# Patient Record
Sex: MALE | Race: WHITE
[De-identification: names, ages, dates, MRNs, and addresses within clinical notes are randomized per-mention and may not be internally consistent; named-entity substitution may affect disease eponyms.]

---

## 2019-10-09 ENCOUNTER — HOSPITAL ENCOUNTER (EMERGENCY)
Dept: HOSPITAL 88 - FSED | Age: 79
Discharge: HOME | End: 2019-10-09
Payer: MEDICARE

## 2019-10-09 VITALS — HEIGHT: 76 IN | WEIGHT: 250 LBS | BODY MASS INDEX: 30.44 KG/M2

## 2019-10-09 DIAGNOSIS — S60.222A: Primary | ICD-10-CM

## 2019-10-09 DIAGNOSIS — E03.9: ICD-10-CM
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
## 2019-10-09 DIAGNOSIS — E78.5: ICD-10-CM
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
## 2019-10-09 DIAGNOSIS — Z95.810: ICD-10-CM

## 2019-10-09 DIAGNOSIS — I10: ICD-10-CM

## 2019-10-09 PROCEDURE — 99283 EMERGENCY DEPT VISIT LOW MDM: CPT

## 2019-10-09 NOTE — XMS REPORT
Summary of Care

                             Created on: 2019



Kyler Gordillo

External Reference #: [id number]

: 1940

Sex: Male



Demographics







                          Address                   [address]  [address]

 

                          Home Phone                [phone]

 

                          Preferred Language        English

 

                          Marital Status            

 

                          Restoration Affiliation     [id number]

 

                          Race                      White

 

                          Ethnic Group              Non-





Author







                          Author                    [facility]

 

                          Organization              [facility]

 

                          Address                   Unknown

 

                          Phone                     Unavailable







Support







                Name            Relationship    Address         Phone

 

                    Cathryn Gordillo    ECON                [address] [place]  [address]                [phone]

 

                Bhanu Gordillo    ECON            Unknown         Unavailable







Care Team Providers







                    Care Team Member Name    Role                Phone

 

                    Gail Gallegos MD    PCP                 [phone]







Reason for Visit

* 





 



                           Reason                    Comments

 

 



                           Cardiology Follow-up      Benign essential HTN









Encounter Details







                          Care Team                 Description



                     Date                Type                Department  

 

                                        



Rico Sabillon MD



[address]



[place] [address] [phone] (Fax)                      Cardiology Follow-up (Benign essential HTN)



                     2019          Office Visit        [facility]  



                                         [facility]  



                                         [address]  



                                         [place] [address]  



                                         [phone]  







Allergies







                                        Comments



                 Active Allergy     Reactions       Severity        Noted Date 

 

                                         



                           Codeine Phosphate         05/10/2010 

 

                                         



                           Motrin Ib                 05/10/2010 

 

                                         



                           Penicillins               05/10/2010 



documented as of this encounter (statuses as of 2019)



Medications







                          End Date                  Status



              Medication     Sig          Dispensed     Refills      Start  



                                         Date  

 

                                                    Active



                     famotidine (PEPCID) 40 MG     Take 1 Tab by       0   



                           tablet                    mouth two     



                                         times daily.     

 

                                                    Active



                     Multiple            Take 1 Tab by       0   



                           Vitamins-Minerals         mouth daily.     



                                         (CENTRUM SILVER OR)      

 

                                                    Active



                     mometasone (NASONEX) 50     daily.              0   



                                         MCG/ACT nasal spray      

 

                                                    Active



                     Tamsulosin HCl 0.4 MG     Take  by            0   



                           CAPS                      mouth daily.     

 

                                                    Active



                     Levothyroxine Sodium 25     Take  by            0   



                           MCG CAPS                  mouth daily.     

 

                                                    Active



                     Coenzyme Q10 (COQ10 OR)     Take  by            0   



                                         mouth daily.     

 

                                                    Active



                     Cetirizine HCl (ZYRTEC     Take  by            0   



                           OR)                       mouth daily.     

 

                                                    Active



              warfarin (COUMADIN) 2.5     TAKE 1 TABLET     90 Tab       1            /201  



                     MG tablet           BY MOUTH            9  



                                         DAILY AS     



                                         DIRECTED BY     



                                         INR     

 

                                                    Active



              atenolol (TENORMIN) 25 MG     TAKE 1 TABLET     180 Tab      0              



                     tablet              BY MOUTH TWO        9  



                                         TIMES DAILY     

 

                                                    Active



              pravastatin (PRAVACHOL)     TAKE 1 TABLET     90 Tab       0              



                     20 MG tablet        BY MOUTH            9  



                                         DAILY     



documented as of this encounter (statuses as of 2019)



Active Problems







 



                           Problem                   Noted Date

 

 



                           Leg edema, right          2019

 

 



                           S/P TAVR (transcatheter aortic valve replacement)     2018

 

 



                           Dementia                  2018

 

 



                           History of pacemaker      2017

 

 



                           Severe aortic stenosis     2017

 

 



                           Pre-syncope               2016

 

 



                           [facility]     2016

 

 



                           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

 

 



                           H/O amaurosis fugax       2016

 

 



                           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

 

 



                           Benign essential HTN      2016

 

 



                           Mixed hyperlipidemia      2016

 

 



                           Carotid artery disease ([facility])     2016

 

 



                           DVT (deep venous thrombosis) ([facility])     2016

 

 



                           Pulmonary embolism ([facility])     2016

 

 



                           Colonic polyp             2016

 

 



                           Lumbosacral disc disease     2016

 

 



                           Bronchiolitis             2016

 

 



                           History of tobacco abuse     2016

 

 



                           Chronic renal insufficiency     2016

 

 



                           Sleep apnea               2016

 

 



                           Diverticulosis of colon     2016

 

 



                           Peptic ulcer disease      2016

 

 



                           Migraine headache         2016

 

 



                           GERD (gastroesophageal reflux disease)     2016

 

 



                           Renal cyst                2016

 

 



                           Seborrheic keratosis      2014

 

 



                           History of nonmelanoma skin cancer     2014

 

 



                           Rosacea                   05/10/2010

 

 



                           BCC (basal cell carcinoma), eyelid     05/10/2010



documented as of this encounter (statuses as of 2019)



Social History







                                        Date



                 Tobacco Use     Types           Packs/Day       Years Used 

 

                                         



                                         Former Smoker    

 

    



                                         Smokeless Tobacco: Never   



                                         Used   









                    Drinks/Week         oz/Week             Comments



                                         Alcohol Use   

 

                                                             



                                         No   









 



                           Sex Assigned at Birth     Date Recorded

 

 



                                         Not on file 









                                        Industry



                           Job Start Date            Occupation 

 

                                        Not on file



                           Not on file               Not on file 









                                        Travel End



                           Travel History            Travel Start 

 





                                         No recent travel history available.



documented as of this encounter



Last Filed Vital Signs







                    Reading             Time Taken          Comments



                                         Vital Sign   

 

                    130/90              2019  1:33 PM CDT     



                                         Blood Pressure   

 

                    69                  2019  1:23 PM CDT     



                                         Pulse   

 

                    -                   -                    



                                         Temperature   

 

                    -                   -                    



                                         Respiratory Rate   

 

                    -                   -                    



                                         Oxygen Saturation   

 

                    -                   -                    



                                         Inhaled Oxygen   



                                         Concentration   

 

                    111.6 kg (246 lb)    2019  1:23 PM CDT     



                                         Weight   

 

                    193 cm (6' 4")      2019  1:23 PM CDT     



                                         Height   

 

                    29.94               2019  1:23 PM CDT     



                                         Body Mass Index   



documented in this encounter



Progress Notes

* Rico Sabillon MD - 2019  2:00 PM CDT



Formatting of this note might be different from the original.

Rico Sabillon MD Office HP/Followup

 Today's Date: 2019 2:15 PM



Kyler Gordillo is a 75 y.o. male patient.

 YOB: 1940 



 Referring MD: 

Gail Gallegos MD (PCP)

[address] [place] [address] , [address] [place] [address]

Phone: [phone]



Octavio Torres MD (N)

Address: [address] [place], [place] [address] [address]

Phone: [phone]



[facility]-Medical Record # [id number].

[facility] Medical Record # [id number]

Estimated body mass index is 29.94 kg/m as calculated from the following:

  Height as of this encounter: 6' 4" (1.93 m).

  Weight as of this encounter: 246 lb (111.6 kg).

Body surface area is 2.45 meters squared.



Present HPI  19 

SInce Last Visit the following is noted:Underwent implant of Annia S3#26 on 5/2
3/18, Afib on Cd

Standard mild FERNANDEZ and leg swelling

Not on Lasix

Broke left femur (pinned) 2019



OPV 1/3/19

SInce Last Visit the following is noted:

Wife complains that he has swelling of both extremities x 2 weeks but R>L

Some redness

Received Lasix from [facility]

Still some SOB with exertion

As usual all the history is from her

INR=3.5



OPV 18

SInce Last Visit the following is noted:

Underwent implant of Annia S3#26 on 18

Much improved

Still some SOB but less fatigue



OPV 3/15/18

Went to [facility] 3/13/18

Low BP; BUN-22, Cr=1.46; H/H=

YVK=907; CXR-OK

Mainly exeertional dyspnea

Recent upper RSCP

Neuro started Zoloft for dementia but she thinks it made him with worsening SOB,
thus it was stopped



OPV 17

VVI implant 17 for symptomatic bradycardia in chronic Atrial fibrillation

He is really not any better

Still fatigued and SOB

No angina

INR=3.4

Took steroids for bad left knee



OPV 17

SInce Last Visit the following is noted:

 he has had a URI

Symptoms include lethargy and exertional dyspnea

Also he has had progressive deterioration in his cognitive function. Seen by loc
al neurologist

No angina

Last KRISTA~ 1.2 with P/M=50/30



OPV 3/13/16

BOOP

Progressive AS

atrial fibrillation

RICAS - 5/25/10

Many co-morbidities

Having pre syncopal episodes which are increasing in frquency.

Two this month

All exertional

Denies chest pain

Knownincreasing AS

Usually sees Dr Hammond (pulmonary) but saw Dr Lang in his place- CT and other te
sts ordered. 

Though he has NO claudication, pt was prescribed Trental??



Angiography 

5/15/18



 TTE-implant of Annia S3#26 on 18; Mitral annular calcium

TTE-DATE 5/5/14

  7/16/15

 3/31/16 9/29/16 4/7/17 2/28/18 5/24/18 6/21/18 1/3/19 9/30/19  

LVEF

 60 60 60 60 55 55 60 60 60 60  

DI   

   0.30 0.27 0.61 0.51 0.44 0.45  

 P/M

 33/18 44/24 43/23 49/22 56/35 60/32 22/10 16  

 Max Adalberto

 2.88  3.31

 2.92 3.14 3.75 3.87 2.36 2.04 2.40 2.4  

 KRISTA

 1.7  1.5

 1.2 1.1 1.0 1.0      

pPA       45  45 40-45  

TR         2+ 2+  



CX-RAYS-

DATE: 16

Large heart, clear lungs



 CT- Chest 

DATE:3/14/16-outside

Main PA is 3.9cm

Mild PIF

emphysema



 YUSUF DOPPLER-

DATE: 12

Normal

Repeat outside 3/15/16-non specific



CAROTID DOPPLER-

DATE: 3/31/16

Right Carotid: patent stent (5/25/10)

All others OK



VENOUS DOPPLER-

DATE:11

Pro OK



HOLTERs-

DATES: 2016

Atrial fib with pauses> 4.0 sec 



 PREVIOUS PACER CHECKs-DATES:



PPM-DATE 17  

Company

 [employer]  

Mode VVIR VVIR  

DOI

 17  

Gen Change

    

EOL

 ~ ~  

A/V pace Xx % a pacing

yy % v pacing 52 % pacing  

other    



Allergies:  

Allergies 

Allergen Reactions 

 Codeine Phosphate  

 Motrin Ib  

 Pcn [Penicillins]  

 

Past Medical History

Diagnosis 

 H/O amaurosis fugax 

 Chronic inflammatory demyelinating polyneuritis 

 Aortic valve disease-progressive by TTE 

 Benign essential HTN 

 Mixed hyperlipidemia 

 Carotid artery disease- [facility] 5/29/10 

 DVT (deep venous thrombosis) 

 Pulmonary embolism 

 Colonic polyp 

 Lumbosacral disc disease 

 Bronchiolitis 

 History of tobacco abuse 

 Chronic renal insufficiency 

 Sleep apnea 

 Diverticulosis of colon 

 Peptic ulcer disease 

 Migraine headache 

 GERD (gastroesophageal reflux disease) 

 Renal cyst 

 Atrial fibrillation with slow VR 

 

Surgical History:

Past Surgical History: 

Procedure Laterality Date 

 HX BACK SURGERY   

 HX CHOLECYSTECTOMY, LAPAROSCOPIC   

 HX LUMBAR DISC SURGERY   

 HX POLYPECTOMY   

 HX SHOULDER SURGERY Right  

 HX SKIN CANCER EXCISION   

 BCC 



Social History:

Social History

  Socioeconomic History

    Marital status: 

    Spouse name: Not on file

    Number of children: Not on file

    Years of education: Not on file

    Highest education level: Not on file

  Occupational History

    Occupation: retired

  Social Needs

    Financial resource strain: Not on file

    Food insecurity:

      Worry: Not on file

      Inability: Not on file

    Transportation needs:

      Medical: Not on file

      Non-medical: Not on file

  Tobacco Use

    Smoking status: Former Smoker

    Smokeless tobacco: Never Used

  Substance and Sexual Activity

    Alcohol use: No

    Drug use: No

    Sexual activity: Not on file

  Lifestyle

    Physical activity:

      Days per week: Not on file

      Minutes per session: Not on file

    Stress: Not on file

  Relationships

    Social connections:

      Talks on phone: Not on file

      Gets together: Not on file

      Attends [employer] service: Not on file

      Active member of club or organization: Not on file

      Attends meetings of clubs or organizations: Not on file

      Relationship status: Not on file

    Intimate partner violence:

      Fear of current or ex partner: Not on file

      Emotionally abused: Not on file

      Physically abused: Not on file

      Forced sexual activity: Not on file

  Other Topics

    Concerns:

      Not on file

  Social History Narrative

    Not on file

  

Family History:  

Family History 

Problem Relation Name Age of Onset 

 Hypertension Father   

 CVA/Stroke Father   

 Heart Attack Father   

 CVA/Stroke Brother   

 Cancer Mother   

 Diabetes Neg Hx   

 

Cardiac Risk assessment:

Hypertension- yes

Hypercholesterol- yes

Obesity- yes

Postive Family History- yes

Diabetes Mellitus- no



Medications:

 atenolol (TENORMIN) 25 MG tablet TAKE 1 TABLET BY MOUTH TWO  TIMES DAILY 

 Cetirizine HCl (ZYRTEC OR) Take  by mouth daily. 

 Coenzyme Q10 (COQ10 OR) Take  by mouth daily. 

 famotidine (PEPCID) 40 MG tablet Take 1 Tab by mouth two times daily. 

 Levothyroxine Sodium 25 MCG CAPS Take  by mouth daily. 

 mometasone (NASONEX) 50 MCG/ACT nasal spray daily. 

 Multiple Vitamins-Minerals (CENTRUM SILVER OR) Take 1 Tab by mouth daily. 

 pravastatin (PRAVACHOL) 20 MG tablet TAKE 1 TABLET BY MOUTH  DAILY 

 Tamsulosin HCl 0.4 MG CAPS Take  by mouth daily. 

 warfarin (COUMADIN) 2.5 MG tablet TAKE 1 TABLET BY MOUTH  DAILY AS DIRECTED 
BY INR 



12 Point ROS: 

General: fatigue

ENT: nasal congestion, sinusitis

Cardiovascular: shortness of breath on exertion

GI: diverticulitis/osis

: nighttime urination (nocturia)

Musculoskeletal: back pain, arthritis

Endocrine: hypoglycemia

Allergic/Immunologic: persistent infections



Physical Exam

[facility] CARDI VITALS 2019 

Height 6' 4" - 

Weight 246 lb - 

/80 130/90 

BP Location right arm left arm 

Patient Position Sitting - 

Pulse 69 - 

Resp - - 

BSA (Calculated - sq m) - - 

BMI (Calculated) - - 

Some recent data might be hidden 



General Appearance:    Alert, cooperative, no distress, appears stated age 

Head:    Normocephalic, without obvious abnormality, atraumatic 

Eyes:    PERRL, conjunctiva/corneas clear, EOM's intact, fundi 

Ears:    Normal TM's and external ear canals, both ears 

Nose:   Nares normal, septum midline, mucosa normal, no drainage    or sinus ten
derness 

Throat:   Lips, mucosa, and tongue normal; teeth and gums normal 

Neck:   Supple, symmetrical, trachea midline, no adenopathy;    

  thyroid:  No enlargement/tenderness/nodules; no carotid

  bruit or JVD 

Back:     Symmetric, no curvature, ROM normal, no CVA tenderness 

Lungs:     Clear to auscultation bilaterally, respirations unlabored 

Chest wall:    No tenderness or deformity 

Heart:    irregular rate and rhythm,

  S1  S2 

  Murmur: none 

Abdomen:     Soft, non-tender, bowel sounds active all four quadrants, 

  no masses, no organomegaly 

Extremities:   Extremities normal, atraumatic, no cyanosis or edema 

  

Skin:   Skin color, texture, turgor normal, no rashes or lesions 

  

Neurologic:   CNII-XII intact. Normal strength, sensation and reflexes   

  throughout 



Vascular Physical  Examination-

Right lower extremity 2+ edema



EC/30/19         1/3/19



6/2/18           3/5/18



DECISION MAKING PLANS for PROBLEMS-



Patient Active Problem List 

Diagnosis 

 Rosacea 

 BCC (basal cell carcinoma), eyelid 

 Seborrheic keratosis 

 History of nonmelanoma skin cancer 

 H/O amaurosis fugax 

 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 

 Benign essential HTN 

 Mixed hyperlipidemia 

 Carotid artery disease ([facility]) 

 DVT (deep venous thrombosis) ([facility]) 

 Pulmonary embolism ([facility]) 

 Colonic polyp 

 Lumbosacral disc disease 

 Bronchiolitis 

 History of tobacco abuse 

 Chronic renal insufficiency 

 Sleep apnea 

 Diverticulosis of colon 

 Peptic ulcer disease 

 Migraine headache 

 GERD (gastroesophageal reflux disease) 

 Renal cyst 

 Pre-syncope 

 Atrial fibrillation ([facility]) 

 Tachycardia-bradycardia syndrome ([facility]) 

 History of pacemaker 

 Severe aortic stenosis 

 Dementia 

 S/P TAVR (transcatheter aortic valve replacement) 

 Leg edema, right 



TODAY'S PLAN-all old records reviewed today

1-Fax appropriate records to [facility]

2- No changes made today



 

Rico Sabillon MD FAC FACP [facility]

Rico Sabillon MD FAC FACP [facility]

[facility]

Clinical Professor [facility] 

 of PVD Services at [facility]/\[facility] [place] [facility]\""

Chief of Peripheral Vascular Medicine at [facility]

[facility]  

[id number]            

[place] [address]

[email or web address]

[email or web address] 

[email or web address]







Electronically signed by Rico Sabillon MD at 2019  5:25 PM CDT

documented in this encounter



Plan of Treatment







                          Care Team                 Description



                     Date                Type                Specialty  

 

                                                     



                     10/07/2019          Procedure           Cardiology  



                                         Visit-Tech   



                                         Performed   









   



                 Health Maintenance     Due Date        Last Done       Comments

 

   



                           MEDICARE AWV              1940  

 

   



                           TETANUS SHOT (ADULT)      10/09/1953  

 

   



                           BMI FOLLOW UP PLAN        10/09/1956  

 

   



                           FALL SCREEN               10/09/2003  

 

   



                           PREVNAR >=65 (PCV13)      10/09/2003  

 

   



                     FLU VACCINE > 6 MONTHS     2019 (Declined) 

 

   



                     PNEUMOVAX >=65 (PPSV23)     Completed           2009 



documented as of this encounter



Procedures







                                        Comments



                 Procedure Name     Priority        Date/Time       Associated Diagnosis 

 

                                        



Results for this procedure are in the results section.



                 ELECTROCARDIOGRAM     Routine         2019      Benign essential HTN 



                           COMPLETE                  2:37 PM CDT  



documented in this encounter



Results

* ELECTROCARDIOGRAM COMPLETE (2019  2:37 PM CDT)





 



                           Narrative                 Performed At

 

 



                                         This result has an attachment that is not available. 



                                         Result approved by Rico Sabillon MD on 19 





documented in this encounter



Visit Diagnoses











                                         Diagnosis

 





                                         S/P TAVR (transcatheter aortic valve replacement) - Primary

 





                                         Benign essential HTN



                                         Essential hypertension, benign



documented in this encounter



Insurance







                                        Type



            Payer      Benefit     Subscriber ID     Effective     Phone      Address 



                           Plan /                    Dates   



                                         Group     

 

                                        Medicare



              MEDICARE     MEDICARE     xxxxxxxxxxx     10/1/2003-      [address] 



                     [address]          Present             [id number] 



                           - MEDICARE                [place], [place] 



                                         [address] 

 

                                        Medicare



              [employer]     [employer]         xxxxxxxxxxx     Effective      [address] 



                     MEDICARE            for all             [id number] 



                     SUPPLEMENT          dates               [facility] [place], [place] 



                                         [address] 









     



            Guarantor Name     Account     Relation to     Date of     Phone      Billing Address



                     Type                Patient             Birth  

 

     



            Kyler Gordillon     Personal/F     Self       1940     [phone]     [address]               (Home)              [place] [address]



documented as of this encounter

## 2019-10-09 NOTE — DIAGNOSTIC IMAGING REPORT
EXAMINATION:  HAND 3 VIEW LT - HOPD    



INDICATION: Fall, trauma



COMPARISON: None

     

FINDINGS:



No acute fracture or dislocation. There are severe degenerative changes at the

first carpometacarpal joint with bone-on-bone contact, osteophyte formation,

and lateral subluxation of the base of the first metacarpal. The soft tissues

appear unremarkable.



IMPRESSION: 

No acute osseous injury.



Severe degenerative changes of the first carpometacarpal joint as above.



Signed by: Rolo Garcia MD on 10/9/2019 12:38 PM

## 2019-10-09 NOTE — XMS REPORT
Patient Summary Document

                             Created on: 10/09/2019



Kyler Gordillo

External Reference #: [id number]

: 1940

Sex: Male



Demographics







                          Address                   [address] [place] [address] [place]  [address]

 

                          Home Phone                [phone]

 

                          Preferred Language        Unknown

 

                          Marital Status            Unknown

 

                          Mosque Affiliation     Unknown

 

                          Race                      Unknown

 

                          Ethnic Group              Unknown





Author







                          Author                    [facility]

 

                          Address                   Unknown

 

                          Phone                     Unavailable







Care Team Providers







                    Care Team Member Name    Role                Phone

 

                    TERRI JOHNSTON    Unavailable         Unavailable







Problems

This patient has no known problems.



Allergies, Adverse Reactions, Alerts

This patient has no known allergies or adverse reactions.



Medications

This patient has no known medications.



Results







           Test Description    Test Time    Test Comments    Text Results    Atomic Results    Result

 Comments

 

                BASIC METABOLIC PANEL    2018 05:54:00                      

 

   

 

                SODIUM (BEAKER) (test code=381)    143 meq/L       136-145          

 

                POTASSIUM (BEAKER) (test code=379)    4.1 meq/L       3.5-5.1          

 

                CHLORIDE (BEAKER) (test code=382)    111 meq/L                  

 

                CO2 (BEAKER) (test code=355)    22 meq/L        22-29            

 

                BLOOD UREA NITROGEN (BEAKER) (test code=354)    16 mg/dL        7-21             

 

                CREATININE (BEAKER) (test code=358)    1.09 mg/dL      0.57-1.25        

 

                GLUCOSE RANDOM (BEAKER) (test code=652)    94 mg/dL                   

 

                CALCIUM (BEAKER) (test code=697)    8.9 mg/dL       8.4-10.2         

 

                EGFR (BEAKER) (test code=1092)    65 mL/min/1.73 sq m                    ESTIMATED GFR IS NOT AS 

ACCURATE AS CREATININE CLEARANCE IN PREDICTING GLOMERULAR FILTRATION RATE. 
ESTIMATED GFR IS NOT APPLICABLE FOR DIALYSIS PATIENTS.





CBC (HEMOGRAM ONLY)2018 05:35:00* 





                Test Item       Value           Reference Range    Comments

 

                WHITE BLOOD CELL COUNT (BEAKER) (test code=775)    7.3 K/ L        3.5-10.5         

 

                RED BLOOD CELL COUNT (BEAKER) (test code=761)    4.10 M/ L       4.63-6.08        

 

                HEMOGLOBIN (BEAKER) (test code=410)    11.2 GM/DL      13.7-17.5        

 

                HEMATOCRIT (BEAKER) (test code=411)    35.8 %          40.1-51.0        

 

                MEAN CORPUSCULAR VOLUME (BEAKER) (test code=753)    87.3 fL         79.0-92.2        

 

                MEAN CORPUSCULAR HEMOGLOBIN (BEAKER) (test code=751)    27.3 pg         25.7-32.2        

 

                    MEAN CORPUSCULAR HEMOGLOBIN CONC (BEAKER) (test code=752)    31.3 GM/DL          32.3-36.5

                                         

 

                RED CELL DISTRIBUTION WIDTH (BEAKER) (test code=412)    14.6 %          11.6-14.4        

 

                PLATELET COUNT (BEAKER) (test code=756)    166 K/CU MM     150-450          

 

                MEAN PLATELET VOLUME (BEAKER) (test code=754)    9.9 fL          9.4-12.4         

 

                NUCLEATED RED BLOOD CELLS (BEAKER) (test code=413)    0 /100 WBC      0-0              





[id number] 10:23:00* 





                Test Item       Value           Reference Range    Comments

 

                ACTIVATED CLOTTING TIME (BEAKER) (test code=441)    109 sec                         TESTED AT [address] [address]





[id number] 09:47:00* 





                Test Item       Value           Reference Range    Comments

 

                ACTIVATED CLOTTING TIME (BEAKER) (test code=441)    252 sec                         TESTED AT [address] [address]





[id number] 09:30:00* 





                Test Item       Value           Reference Range    Comments

 

                ACTIVATED CLOTTING TIME (BEAKER) (test code=441)    230 sec                         TESTED AT [address] [address]





PROTHROMBIN TIME/[id number] 06:58:00* 





                Test Item       Value           Reference Range    Comments

 

                PROTIME (BEAKER) (test code=759)    15.9 seconds    11.7-14.7        

 

                INR (BEAKER) (test code=370)    1.3             <=5.9            





RECOMMENDED COUMADIN/WARFARIN INR THERAPY RANGESSTANDARD DOSE: 2.0 - 3.0   Inclu
shona: PROPHYLAXIS for venous thrombosis, systemic embolization; TREATMENT for zackary
ous thrombosis and/or pulmonary embolus.HIGH RISK: Target INR is 2.5-3.5 for pat
ients with mechanical heart valves.Within 24 hours, if on CoumadinBUN AND 
CREATININE2018-05-15 18:57:00* 





                Test Item       Value           Reference Range    Comments

 

                BLOOD UREA NITROGEN (BEAKER) (test code=354)    17 mg/dL        7-21             

 

                CREATININE (BEAKER) (test code=358)    1.23 mg/dL      0.57-1.25        

 

                EGFR (BEAKER) (test code=1092)    57 mL/min/1.73 sq m                    ESTIMATED GFR IS NOT AS 

ACCURATE AS CREATININE CLEARANCE IN PREDICTING GLOMERULAR FILTRATION RATE. 
ESTIMATED GFR IS NOT APPLICABLE FOR DIALYSIS PATIENTS.





B-TYPE NATRIURETIC FACTOR (BNP)2018-05-15 11:33:00* 





                Test Item       Value           Reference Range    Comments

 

                B-TYPE NATRIURETIC PEPTIDE (BEAKER) (test code=700)    370 pg/mL       0-100            





ALBUMIN2018-05-15 11:19:00* 





                Test Item       Value           Reference Range    Comments

 

                ALBUMIN (BEAKER) (test code=1145)    3.7 g/dL        3.5-5.0          





PROTHROMBIN TIME/INR2018-05-15 10:33:00* 





                Test Item       Value           Reference Range    Comments

 

                PROTIME (BEMAMTA) (test code=759)    16.9 seconds    11.7-14.7        

 

                INR (BEAKER) (test code=370)    1.4             <=5.9            





RECOMMENDED COUMADIN/WARFARIN INR THERAPY RANGESSTANDARD DOSE: 2.0 - 3.0   Inclu
shona: PROPHYLAXIS for venous thrombosis, systemic embolization; TREATMENT for zackary
ous thrombosis and/or pulmonary embolus.HIGH RISK: Target INR is 2.5-3.5 for pat
ients with mechanical heart valves.[id number] 08:21:00* 





                Test Item       Value           Reference Range    Comments

 

                POC-CREATININE (BEMAMTA) (test code=1859)    1.3 mg/dL       0.6-1.3         TESTED AT [facility] [address]

 [facility] [address]

 

                POC-EGFR (BEMAMTA) (test code=1860)    53 mL/min/1.73M2                     





CT, CTA, [id number] 17:08:00Addendum BeginsREPORT STATUS:A PATIENT ID:   
[id number] ADDENDUM: I agree with the nonvascular findings as reported by Dr. Fang. A 7 mm nodule is seen in the right middle lobe, slightly increased from 
2013 when it measured 5 mm. Follow-up chest CT in 6-12 months is recommended. 
Signed: Bryan, Betito MDReport Verified Date/Time:  2018 17:08:19 Reading 
Location: [place] [address] Angio Body Reading RoomAddendum EndsFINAL REPORT PATIENT 
ID:   [id number] CT angiography of the thoracoabdominal aorta and pelvic arteries,
2nd May 2018 INDICATION: This is a 77 year old male with a diagnosis of aortic 
stenosis presents for preprocedure TAVR assessment.  This study is performed in 
an attempt to avoid an invasive procedure. TECHNIQUE: Spiral acquisition before 
and during intravenous contrast administration using a Elida multidetector CT 
scanner. Images were obtained before and during the dynamic passage of 
intravenous contrast material.  Multi-planar 3-D volume-rendering reconstruction
was performed using an independent workstation interactively by the interpreting
physician as well as the 3-D specialist for optimal visualization of the 
thoracoabdominal aorta, the pelvic arteries as well as its proximal branches. 
Please refer to the contrast sheet scanned in the EPIC system for the amount and
route of contrast given. This exam was performed according to our departmental 
dose-optimisation programme, which includes automated exposure control, 
adjustment of the mA and/or kV according to patient size and/or use of iterative
reconstruction technique. Dose modulation, iterative reconstruction, and/or 
weight based adjustment of the mA/kV was utilized to reduce the radiation dose 
to as low as reasonably achievable. FINDINGS:  VASCULAR: An electronic device is
identified in the left upper chest, with pacing lead identified in the right-
sided cardiac chambers. The central pulmonary artery is normal in calibre. The 
cardiac chamber demonstrate normal atrioventricular and ventriculoarterial 
concordance, systemic and pulmonary venous return. Coronary artery origins are 
normal and coronary artery calcifications identified in the LAD and LCx territor
y. Minimal mitral annular calcification is identified in the posterior mitral va
lve annulus. The left ventricle is normal in size. Left atrial enlargement is id
entified. Patient has a diagnosis of aortic stenosis. Aortic valve is tricuspid.
Agatston score is approximately 3085. The location of aortic valvular calcifica
tion can be seen in reformatted data set sent to PACS. Regarding the aorta, ther
e is only minimal calcification seen in the aortic root and thereafter remainder
of the ascending thoracic aorta is free of calcification. There is mild calcifi
cation seen in the transverse arch, descending thoracic aorta, and moderate calc
ification seen in the infrarenal abdominal aorta. No acute aortic pathology is i
dentified and no dissection or contained rupture is seen. However, there is athe
rosclerotic ulceration identified in the distal infrarenal abdominal aorta, imag
e 401 representing a spectrum of both calcified and noncalcified granuloma. Arch
vessel branching pattern is normal and the visualised arch vessel has scattered 
calcific atherosclerosis identified. The left subclavian artery, at image 23, m
easure approximately 6 to 7 mm in diameter. The right subclavian artery, at imag
e 17, measure 9 to 10 mm in diameter. The coeliac axis, SMA have no obstructive 
lesion identified, however, calcific atherosclerosis seen in the celiac axis and
calcific and noncalcific atherosclerosis that is nonobstructive is seen in the 
SMA. Replaced right hepatic artery is identified, a common variant. The GURINDER is p
atent. There are single left and right renal arteries with no obstructive lesion
identified. The common iliac, external iliac, common femoral, and the visualized
superficial femoral arteries have no obstructive lesion identified. There is a
lso atherosclerotic ulceration identified in the right common iliac arteries, fo
r example image 476. Refer to below regarding the minimum dimensions. Dimensions
that may be helpful TAVR as follows: Only minimal calcification is seen in the 
aortic root. The major and minor aortic annulus diameter measures 29.2 and 21.8 
mm, respectively. The aortic annulus perimeter measured 83 mm and the cross-sect
ional area measures 520 mm2. The aortic annulus diameter at the traditional LVOT
and coronal LVOT measures 22.0 and 27.8 mm, respectively. Measurement was made 
using 30% reconstruction, with the least motion artefact. For reference purpose,
per GOLDMAN S3 brochure, recommendation are as follows: CT area between 273 to 
345 mm2 (20 mm valve); 338 to 430 mm2 (23 mm valve); 430 to 546 mm2 (26 mm valve
); 540 to 683 mm2 (29 mm valve). For reference purpose, per CoreValve Evolut R b
rochure, recommendation are as follows: CT perimeter between 56.5-62.8 mm (23 mm
valve); 62.8-72.3 mm (26 mm valve); 72.3-81.7 mm (29 mm valve); and 81.7-94.2. 
mm (34 mm valve). Agatston Score is 3085.   The sinus of Valsalva height to the 
takeoff of the coronary artery ostium, RCC (diastole): 13.5 mmThe sinus of Valsa
lva height and the takeoff of the coronary artery ostium, LCC (diastole): 15.0 m
m The sinus of Valsalva diameter, RCC (diastole): 32.8 mmThe sinus of Valsalva d
iameter, LCC (diastole): 32.5 mmThe sinus of Valsalva diameter, NCC (diastole): 
36.5 mm The sinotubular junction measures approximately 30.7 x 32.5 mm. The aort
ic root angulation measures 49.6 degrees. The minimal and perpendicular abdomina
l aortic diameter measure 13.4 and 16.7 mm, respectively, at the level of the at
herosclerotic ulceration, at image 397.  There is no evidence of thoracoabdomina
l aortic aneurysm or stent placement present. The minimum and the perpendicular 
left common iliac artery measures 10.2 and 10.9 mm, respectively with no  tortuo
sity and mild  calcific atherosclerosis present. The minimum and the perpendicul
ar left external iliac artery measures 7.6 and 8.2 mm, respectively with mild to
moderate  tortuosity and no  calcific atherosclerosis present. The minimum and 
the perpendicular left femoral artery measures 7.4 and 9.0 mm, respectively with
minimal  tortuosity and mild  calcific atherosclerosis present. The minimum and 
the perpendicular right common iliac artery measures 8.5 and 9.0 mm, respective
ly with mild  tortuosity and mild  calcific atherosclerosis present, at image 48
1 with localized atherosclerotic ulceration present. The minimum and the perpend
icular right  external iliac artery measures 8.1 and 8.3 mm, respectively with m
ild-to-moderate  tortuosity and no  calcific atherosclerosis present. The minimu
m and the perpendicular right femoral artery measures 8.6 and 9.1 mm, respective
ly with mild tortuosity and mild calcific atherosclerosis present. NONVASCULAR: 
The visualised thyroid gland has focal calcification identified, in the right th
yroid lobe, at image 26, measure 5 to 6 mm in diameter. This can be further asse
ssed by dedicated thyroid ultrasound scan. The chest wall and mediastinum has no
acute abnormalities identified. No significant adenopathy is seen. However, the
re is some small lymph nodes identified, for example, in the left hilum, measure
less than 1 cm in size. In the lung windows, no obvious endobronchial lesion is 
seen, and no pleural effusions identified. Some dependent changes are seen in t
he lung bases. Some subsegmental atelectatic changes are noted. A nodule is iden
tified in the right middle lobe, at image 107, measures approximately 7 to 8 mm 
in diameter. In 2013, this structure measures approximately 5 to 6 mm in diamete
r, image using different spatial resolution. No other nodule is identified. In t
he abdomen, the liver and spleen appears unremarkable. The liver edge is smooth.
No abnormal enhancing structure is identified. Hypodensities are seen in the li
nigel, some are too small to characterize. The larger ones have no enhancement aft
er contrast administration indicating simple in nature. The largest one, at imag
e 282, measure 3.6 cm in diameter. The adrenal glands are not enlarged. The panc
reas appears unremarkable. The gallbladder is not well visualized. No acute ajit
l pathology is seen and no hydronephrosis or perirenal fluid collections identif
ied. Small renal stones are seen, that is nonobstructive, and at image 69 of the
left kidney, measure 8 mm in diameter. In addition, renal cysts identified in t
he lateral aspect of the right kidney, image 337, measure 3.1 cm in diameter wit
h no enhancement after contrast administration suggesting proteinaceous/hemorrha
gic in nature. Another cyst is also identified, again with no significant enhanc
ement seen. Bowel is not well assessed by CT angiography as enteric contrast is 
not given. No obvious bowel dilation is identified. Colonic diverticulum is seen
in the distal colon with no evidence of acute diverticulitis. Bilateral fat-con
taining inguinal hernia is seen left greater than the right. The prostate gland 
is mildly prominent. The bladder is unremarkable. No free air or free fluid seen
abdomen and pelvis. No significant retroperitoneal adenopathy is identified. In 
the bony windows, no acute bony pathology is seen. Some degenerative changes are
noted. CONCLUSIONS: 1.  Patient has a diagnosis of aortic stenosis. The aortic 
valve is tricuspid. Agatston score is over 3000. By planimetry, the aortic valve
area is approximately 93 sq mm. Only minimal mitral annular calcification is s
een. No obvious calcification is seen in the aortic root. No acute aortic pathol
ogy is identified.  Localized atherosclerotic ulceration identified in the dista
l infrarenal aorta, as well as in the right common iliac artery. Dimensions that
may be helpful for TAVR as described above. 2.  Coronary atherosclerosis seen in
the left coronary system. 3.  No acute pulmonary pathology is identified.  A 7 
to 8 mm nodule seen in the right middle lobe. This is already been present in 2
013, however, in 2013, it measures 5 to 6 mm in diameter suggesting minimal incr
eased in size. This could be followed in 6-12 months time. Nevertheless, an adde
ndum will be dictated thereafter, if needed. 4.  Other findings as described abo
ve. Calcification is identified in the right thyroid lobe that can be further as
sessed by dedicated ultrasound scan of the thyroid. 5.  An addendum will dictate
d by the Consultant Radiologist regarding the nonvascular findings.  Signed: Oziel Interiano MDReport Verified Date/Time:  2018 16:08:18 Reading Location
: [place] [address] Cardiology MRI    Electronically signed by: BETITO ADAMS MD
on 2018 05:08 PM CT, CTA [id number] 17:08:00Addendum BeginsREPORT 
STATUS:A PATIENT ID:   [id number] ADDENDUM: I agree with the nonvascular findings 
as reported by Dr. Fang. A 7 mm nodule is seen in the right middle lobe, 
slightly increased from 2013 when it measured 5 mm. Follow-up chest CT in 6-12 
months is recommended. Signed: Betito Adams Verified Date/Time:  
2018 17:08:19 Reading Location: [place] [id number] Angio Body Reading R
oomAddendum EndsFINAL REPORT PATIENT ID:   [id number] CT angiography of the thorac
oabdominal aorta and pelvic arteries, 2nd May 2018 INDICATION: This is a 77 year
old male with a diagnosis of aortic stenosis presents for preprocedure TAVR ass
essment.  This study is performed in an attempt to avoid an invasive procedure. 
TECHNIQUE: Spiral acquisition before and during intravenous contrast administrat
ion using a Elida multidetector CT scanner. Images were obtained before and du
ring the dynamic passage of intravenous contrast material.  Multi-planar 3-D vol
ume-rendering reconstruction was performed using an independent workstation inte
ractively by the interpreting physician as well as the 3-D specialist for optima
l visualization of the thoracoabdominal aorta, the pelvic arteries as well as it
s proximal branches. Please refer to the contrast sheet scanned in the EPIC syst
em for the amount and route of contrast given. This exam was performed according
to our departmental dose-optimisation programme, which includes automated expos
ure control, adjustment of the mA and/or kV according to patient size and/or use
of iterative reconstruction technique. Dose modulation, iterative reconstructio
n, and/or weight based adjustment of the mA/kV was utilized to reduce the radiat
ion dose to as low as reasonably achievable. FINDINGS:  VASCULAR: An electronic 
device is identified in the left upper chest, with pacing lead identified in the
right-sided cardiac chambers. The central pulmonary artery is normal in calibre.
The cardiac chamber demonstrate normal atrioventricular and ventriculoarterial 
concordance, systemic and pulmonary venous return. Coronary artery origins are 
normal and coronary artery calcifications identified in the LAD and LCx territor
y. Minimal mitral annular calcification is identified in the posterior mitral va
lve annulus. The left ventricle is normal in size. Left atrial enlargement is id
entified. Patient has a diagnosis of aortic stenosis. Aortic valve is tricuspid.
Agatston score is approximately 3085. The location of aortic valvular calcifica
tion can be seen in reformatted data set sent to PACS. Regarding the aorta, ther
e is only minimal calcification seen in the aortic root and thereafter remainder
of the ascending thoracic aorta is free of calcification. There is mild calcifi
cation seen in the transverse arch, descending thoracic aorta, and moderate calc
ification seen in the infrarenal abdominal aorta. No acute aortic pathology is i
dentified and no dissection or contained rupture is seen. However, there is athe
rosclerotic ulceration identified in the distal infrarenal abdominal aorta, imag
e 401 representing a spectrum of both calcified and noncalcified granuloma. Arch
vessel branching pattern is normal and the visualised arch vessel has scattered 
calcific atherosclerosis identified. The left subclavian artery, at image 23, m
easure approximately 6 to 7 mm in diameter. The right subclavian artery, at imag
e 17, measure 9 to 10 mm in diameter. The coeliac axis, SMA have no obstructive 
lesion identified, however, calcific atherosclerosis seen in the celiac axis and
calcific and noncalcific atherosclerosis that is nonobstructive is seen in the 
SMA. Replaced right hepatic artery is identified, a common variant. The GURINDER is p
atent. There are single left and right renal arteries with no obstructive lesion
identified. The common iliac, external iliac, common femoral, and the visualized
superficial femoral arteries have no obstructive lesion identified. There is a
lso atherosclerotic ulceration identified in the right common iliac arteries, fo
r example image 476. Refer to below regarding the minimum dimensions. Dimensions
that may be helpful TAVR as follows: Only minimal calcification is seen in the 
aortic root. The major and minor aortic annulus diameter measures 29.2 and 21.8 
mm, respectively. The aortic annulus perimeter measured 83 mm and the cross-sect
ional area measures 520 mm2. The aortic annulus diameter at the traditional LVOT
and coronal LVOT measures 22.0 and 27.8 mm, respectively. Measurement was made 
using 30% reconstruction, with the least motion artefact. For reference purpose,
per [employer] S3 brochure, recommendation are as follows: CT area between 273 to 
345 mm2 (20 mm valve); 338 to 430 mm2 (23 mm valve); 430 to 546 mm2 (26 mm valve
); 540 to 683 mm2 (29 mm valve). For reference purpose, per CoreValve Evolut R b
rochure, recommendation are as follows: CT perimeter between 56.5-62.8 mm (23 mm
valve); 62.8-72.3 mm (26 mm valve); 72.3-81.7 mm (29 mm valve); and 81.7-94.2. 
mm (34 mm valve). Agatston Score is 3085.   The sinus of Valsalva height to the 
takeoff of the coronary artery ostium, RCC (diastole): 13.5 mmThe sinus of Valsa
lva height and the takeoff of the coronary artery ostium, LCC (diastole): 15.0 m
m The sinus of Valsalva diameter, RCC (diastole): 32.8 mmThe sinus of Valsalva d
iameter, LCC (diastole): 32.5 mmThe sinus of Valsalva diameter, NCC (diastole): 
36.5 mm The sinotubular junction measures approximately 30.7 x 32.5 mm. The aort
ic root angulation measures 49.6 degrees. The minimal and perpendicular abdomina
l aortic diameter measure 13.4 and 16.7 mm, respectively, at the level of the at
herosclerotic ulceration, at image 397.  There is no evidence of thoracoabdomina
l aortic aneurysm or stent placement present. The minimum and the perpendicular 
left common iliac artery measures 10.2 and 10.9 mm, respectively with no  tortuo
sity and mild  calcific atherosclerosis present. The minimum and the perpendicul
ar left external iliac artery measures 7.6 and 8.2 mm, respectively with mild to
moderate  tortuosity and no  calcific atherosclerosis present. The minimum and 
the perpendicular left femoral artery measures 7.4 and 9.0 mm, respectively with
minimal  tortuosity and mild  calcific atherosclerosis present. The minimum and 
the perpendicular right common iliac artery measures 8.5 and 9.0 mm, respective
ly with mild  tortuosity and mild  calcific atherosclerosis present, at image 48
1 with localized atherosclerotic ulceration present. The minimum and the perpend
icular right  external iliac artery measures 8.1 and 8.3 mm, respectively with m
ild-to-moderate  tortuosity and no  calcific atherosclerosis present. The minimu
m and the perpendicular right femoral artery measures 8.6 and 9.1 mm, respective
ly with mild tortuosity and mild calcific atherosclerosis present. NONVASCULAR: 
The visualised thyroid gland has focal calcification identified, in the right th
yroid lobe, at image 26, measure 5 to 6 mm in diameter. This can be further asse
ssed by dedicated thyroid ultrasound scan. The chest wall and mediastinum has no
acute abnormalities identified. No significant adenopathy is seen. However, the
re is some small lymph nodes identified, for example, in the left hilum, measure
less than 1 cm in size. In the lung windows, no obvious endobronchial lesion is 
seen, and no pleural effusions identified. Some dependent changes are seen in t
he lung bases. Some subsegmental atelectatic changes are noted. A nodule is iden
tified in the right middle lobe, at image 107, measures approximately 7 to 8 mm 
in diameter. In 2013, this structure measures approximately 5 to 6 mm in diamete
r, image using different spatial resolution. No other nodule is identified. In t
he abdomen, the liver and spleen appears unremarkable. The liver edge is smooth.
No abnormal enhancing structure is identified. Hypodensities are seen in the li
nigel, some are too small to characterize. The larger ones have no enhancement aft
er contrast administration indicating simple in nature. The largest one, at imag
e 282, measure 3.6 cm in diameter. The adrenal glands are not enlarged. The panc
reas appears unremarkable. The gallbladder is not well visualized. No acute ajit
l pathology is seen and no hydronephrosis or perirenal fluid collections identif
ied. Small renal stones are seen, that is nonobstructive, and at image 69 of the
left kidney, measure 8 mm in diameter. In addition, renal cysts identified in t
he lateral aspect of the right kidney, image 337, measure 3.1 cm in diameter wit
h no enhancement after contrast administration suggesting proteinaceous/hemorrha
gic in nature. Another cyst is also identified, again with no significant enhanc
ement seen. Bowel is not well assessed by CT angiography as enteric contrast is 
not given. No obvious bowel dilation is identified. Colonic diverticulum is seen
in the distal colon with no evidence of acute diverticulitis. Bilateral fat-con
taining inguinal hernia is seen left greater than the right. The prostate gland 
is mildly prominent. The bladder is unremarkable. No free air or free fluid seen
abdomen and pelvis. No significant retroperitoneal adenopathy is identified. In 
the bony windows, no acute bony pathology is seen. Some degenerative changes are
noted. CONCLUSIONS: 1.  Patient has a diagnosis of aortic stenosis. The aortic 
valve is tricuspid. Agatston score is over 3000. By planimetry, the aortic valve
area is approximately 93 sq mm. Only minimal mitral annular calcification is s
een. No obvious calcification is seen in the aortic root. No acute aortic pathol
ogy is identified.  Localized atherosclerotic ulceration identified in the dista
l infrarenal aorta, as well as in the right common iliac artery. Dimensions that
may be helpful for TAVR as described above. 2.  Coronary atherosclerosis seen in
the left coronary system. 3.  No acute pulmonary pathology is identified.  A 7 
to 8 mm nodule seen in the right middle lobe. This is already been present in 2
013, however, in , it measures 5 to 6 mm in diameter suggesting minimal incr
eased in size. This could be followed in 6-12 months time. Nevertheless, an adde
ndum will be dictated thereafter, if needed. 4.  Other findings as described abo
ve. Calcification is identified in the right thyroid lobe that can be further as
sessed by dedicated ultrasound scan of the thyroid. 5.  An addendum will dictate
d by the Consultant Radiologist regarding the nonvascular findings.  Signed: Oziel [place] Verified Date/Time:  2018 16:08:18 Reading Location
: [place] [id number] [facility]    Electronically signed by: BETITO ADAMS MD
on 2018 05:08 PM